# Patient Record
Sex: MALE | Race: WHITE | HISPANIC OR LATINO | URBAN - METROPOLITAN AREA
[De-identification: names, ages, dates, MRNs, and addresses within clinical notes are randomized per-mention and may not be internally consistent; named-entity substitution may affect disease eponyms.]

---

## 2017-08-10 ENCOUNTER — APPOINTMENT (RX ONLY)
Dept: URBAN - METROPOLITAN AREA CLINIC 15 | Facility: CLINIC | Age: 27
Setting detail: DERMATOLOGY
End: 2017-08-10

## 2017-08-10 DIAGNOSIS — L85.3 XEROSIS CUTIS: ICD-10-CM

## 2017-08-10 DIAGNOSIS — L0390 CELLULITIS AND ABSCESS OF UNSPECIFIED SITES: ICD-10-CM

## 2017-08-10 DIAGNOSIS — L81.0 POSTINFLAMMATORY HYPERPIGMENTATION: ICD-10-CM

## 2017-08-10 DIAGNOSIS — L0391 CELLULITIS AND ABSCESS OF UNSPECIFIED SITES: ICD-10-CM

## 2017-08-10 PROBLEM — L70.0 ACNE VULGARIS: Status: ACTIVE | Noted: 2017-08-10

## 2017-08-10 PROBLEM — L02.11 CUTANEOUS ABSCESS OF NECK: Status: ACTIVE | Noted: 2017-08-10

## 2017-08-10 PROCEDURE — ? PRESCRIPTION

## 2017-08-10 PROCEDURE — ? PATIENT SPECIFIC COUNSELING

## 2017-08-10 PROCEDURE — ? TREATMENT REGIMEN

## 2017-08-10 PROCEDURE — 10060 I&D ABSCESS SIMPLE/SINGLE: CPT

## 2017-08-10 PROCEDURE — ? INCISION AND DRAINAGE

## 2017-08-10 PROCEDURE — 99203 OFFICE O/P NEW LOW 30 MIN: CPT | Mod: 25

## 2017-08-10 PROCEDURE — ? COUNSELING

## 2017-08-10 RX ORDER — CLINDAMYCIN PHOSPHATE 10 MG/ML
SOLUTION TOPICAL
Qty: 1 | Refills: 2 | Status: ERX | COMMUNITY
Start: 2017-08-10

## 2017-08-10 RX ADMIN — CLINDAMYCIN PHOSPHATE 1: 10 SOLUTION TOPICAL at 00:00

## 2017-08-10 ASSESSMENT — LOCATION DETAILED DESCRIPTION DERM
LOCATION DETAILED: RIGHT SUPERIOR ANTERIOR NECK
LOCATION DETAILED: RIGHT SUPERIOR LATERAL NECK
LOCATION DETAILED: LEFT SUPERIOR ANTERIOR NECK
LOCATION DETAILED: LEFT ELBOW
LOCATION DETAILED: LEFT SUPERIOR LATERAL NECK
LOCATION DETAILED: RIGHT ELBOW
LOCATION DETAILED: RIGHT INFERIOR LATERAL NECK

## 2017-08-10 ASSESSMENT — SEVERITY ASSESSMENT
SEVERITY: MILD TO MODERATE
SEVERITY: MILD

## 2017-08-10 ASSESSMENT — LOCATION SIMPLE DESCRIPTION DERM
LOCATION SIMPLE: RIGHT ANTERIOR NECK
LOCATION SIMPLE: LEFT ANTERIOR NECK
LOCATION SIMPLE: RIGHT ELBOW
LOCATION SIMPLE: LEFT ELBOW

## 2017-08-10 ASSESSMENT — LOCATION ZONE DERM
LOCATION ZONE: NECK
LOCATION ZONE: ARM

## 2017-08-10 NOTE — PROCEDURE: TREATMENT REGIMEN
Otc Regimen: CeraVe Cream once daily after showers
Detail Level: Zone
Otc Regimen: Zinc oxide / titanium dioxide highly recommended daily

## 2017-08-10 NOTE — PROCEDURE: PATIENT SPECIFIC COUNSELING
Detail Level: Zone
Discussed the importance use of creams to help keep the skin hydrated.
Discussed the importance use of sunscreen to prevent the condition to not get worse.

## 2017-08-10 NOTE — PROCEDURE: INCISION AND DRAINAGE
Epidermal Closure: simple interrupted
Render Postcare In Note?: Yes
Epidermal Sutures: 4-0 Ethilon
Curette: No
Anesthesia Type: 1% lidocaine with epinephrine
Detail Level: Simple
Body Location Override (Optional - Billing Will Still Be Based On Selected Body Map Location If Applicable): neck
Post-Care Instructions: I reviewed with the patient in detail post-care instructions. Patient should keep wound covered and call the office should any redness, pain, swelling or worsening occur.
Size Of Lesion In Cm (Optional But May Be Required For Some Insurances): 0
Lesion Type: Cyst
Preparation Text: The area was prepped in the usual clean fashion.
Method: 11 blade
Consent was obtained and risks were reviewed including but not limited to delayed wound healing, infection, need for multiple I and D's, and pain.
Anesthesia Volume In Cc: 1
Dressing: dry sterile dressing
Suture Text: The incision was partially closed with
Wound Care: Bacitracin
Curette Text (Optional): After the contents were expressed a curette was used to partially remove the cyst wall.

## 2017-12-28 ENCOUNTER — APPOINTMENT (RX ONLY)
Dept: URBAN - METROPOLITAN AREA CLINIC 15 | Facility: CLINIC | Age: 27
Setting detail: DERMATOLOGY
End: 2017-12-28

## 2017-12-28 DIAGNOSIS — L72.8 OTHER FOLLICULAR CYSTS OF THE SKIN AND SUBCUTANEOUS TISSUE: ICD-10-CM

## 2017-12-28 DIAGNOSIS — L85.3 XEROSIS CUTIS: ICD-10-CM

## 2017-12-28 DIAGNOSIS — L81.0 POSTINFLAMMATORY HYPERPIGMENTATION: ICD-10-CM

## 2017-12-28 PROCEDURE — ? TREATMENT REGIMEN

## 2017-12-28 PROCEDURE — ? PRESCRIPTION

## 2017-12-28 PROCEDURE — 99213 OFFICE O/P EST LOW 20 MIN: CPT | Mod: 25

## 2017-12-28 PROCEDURE — ? INTRALESIONAL KENALOG

## 2017-12-28 PROCEDURE — 11900 INJECT SKIN LESIONS </W 7: CPT

## 2017-12-28 PROCEDURE — ? ADDITIONAL NOTES

## 2017-12-28 PROCEDURE — ? COUNSELING

## 2017-12-28 RX ORDER — CLINDAMYCIN PHOSPHATE 10 MG/ML
LOTION TOPICAL
Qty: 1 | Refills: 3 | Status: ERX | COMMUNITY
Start: 2017-12-28

## 2017-12-28 RX ADMIN — CLINDAMYCIN PHOSPHATE 1: 10 LOTION TOPICAL at 00:00

## 2017-12-28 ASSESSMENT — LOCATION DETAILED DESCRIPTION DERM
LOCATION DETAILED: LEFT SUPERIOR ANTERIOR NECK
LOCATION DETAILED: RIGHT SUPERIOR ANTERIOR NECK
LOCATION DETAILED: RIGHT SUPERIOR MEDIAL UPPER BACK
LOCATION DETAILED: INFERIOR THORACIC SPINE
LOCATION DETAILED: RIGHT INFERIOR LATERAL NECK
LOCATION DETAILED: RIGHT SUPERIOR MEDIAL BUCCAL CHEEK

## 2017-12-28 ASSESSMENT — LOCATION SIMPLE DESCRIPTION DERM
LOCATION SIMPLE: LEFT ANTERIOR NECK
LOCATION SIMPLE: RIGHT CHEEK
LOCATION SIMPLE: RIGHT ANTERIOR NECK
LOCATION SIMPLE: UPPER BACK
LOCATION SIMPLE: RIGHT UPPER BACK

## 2017-12-28 ASSESSMENT — LOCATION ZONE DERM
LOCATION ZONE: NECK
LOCATION ZONE: TRUNK
LOCATION ZONE: FACE

## 2017-12-28 NOTE — PROCEDURE: ADDITIONAL NOTES
Additional Notes: Consider additional IL-TAC as patient is concerned about scarring and PIPA. Plan excision of right anterior medial neck lesion with Dr. Dylan Wills if lesion recurs. Patient understands. Other small lesions likely to resolve with IL-TAC. Patient advised to not pick or squeeze at lesions.

## 2017-12-28 NOTE — PROCEDURE: INTRALESIONAL KENALOG
Lot # (Optional): [id number]
Include Z78.9 (Other Specified Conditions Influencing Health Status) As An Associated Diagnosis?: Yes
Consent: The risks of atrophy were reviewed with the patient.
Detail Level: Zone
Total Volume Injected (Ccs- Only Use Numbers And Decimals): 0.4
Kenalog Preparation: Kenalog
Expiration Date (Optional): 3/2019
X Size Of Lesion In Cm (Optional): 0
Medical Necessity Clause: This procedure was medically necessary because the lesions that were treated were:
Concentration Of Solution Injected (Mg/Ml): 2.5

## 2018-02-23 ENCOUNTER — APPOINTMENT (RX ONLY)
Dept: URBAN - METROPOLITAN AREA CLINIC 15 | Facility: CLINIC | Age: 28
Setting detail: DERMATOLOGY
End: 2018-02-23

## 2018-02-23 DIAGNOSIS — L85.3 XEROSIS CUTIS: ICD-10-CM

## 2018-02-23 DIAGNOSIS — L81.0 POSTINFLAMMATORY HYPERPIGMENTATION: ICD-10-CM

## 2018-02-23 DIAGNOSIS — L72.8 OTHER FOLLICULAR CYSTS OF THE SKIN AND SUBCUTANEOUS TISSUE: ICD-10-CM

## 2018-02-23 PROCEDURE — ? COUNSELING

## 2018-02-23 PROCEDURE — 99213 OFFICE O/P EST LOW 20 MIN: CPT | Mod: 25

## 2018-02-23 PROCEDURE — ? TREATMENT REGIMEN

## 2018-02-23 PROCEDURE — ? INTRALESIONAL KENALOG

## 2018-02-23 PROCEDURE — 11900 INJECT SKIN LESIONS </W 7: CPT

## 2018-02-23 ASSESSMENT — LOCATION DETAILED DESCRIPTION DERM
LOCATION DETAILED: LEFT LOWER CUTANEOUS LIP
LOCATION DETAILED: LEFT SUPERIOR LATERAL NECK
LOCATION DETAILED: RIGHT MID-UPPER BACK
LOCATION DETAILED: RIGHT INFERIOR LATERAL NECK
LOCATION DETAILED: STERNUM
LOCATION DETAILED: LEFT INFERIOR LATERAL NECK

## 2018-02-23 ASSESSMENT — LOCATION ZONE DERM
LOCATION ZONE: NECK
LOCATION ZONE: LIP
LOCATION ZONE: TRUNK

## 2018-02-23 ASSESSMENT — LOCATION SIMPLE DESCRIPTION DERM
LOCATION SIMPLE: LEFT LIP
LOCATION SIMPLE: RIGHT UPPER BACK
LOCATION SIMPLE: LEFT ANTERIOR NECK
LOCATION SIMPLE: CHEST
LOCATION SIMPLE: RIGHT ANTERIOR NECK

## 2018-02-23 NOTE — PROCEDURE: INTRALESIONAL KENALOG
Total Volume Injected (Ccs- Only Use Numbers And Decimals): 0.5
Include Z78.9 (Other Specified Conditions Influencing Health Status) As An Associated Diagnosis?: Yes
Expiration Date (Optional): 3/2019
Medical Necessity Clause: This procedure was medically necessary because the lesions that were treated were:
Lot # (Optional): [id number]
X Size Of Lesion In Cm (Optional): 0
Kenalog Preparation: Kenalog
Concentration Of Solution Injected (Mg/Ml): 2.5
Detail Level: Simple
Consent: The risks of atrophy were reviewed with the patient.

## 2021-01-27 ENCOUNTER — APPOINTMENT (RX ONLY)
Dept: URBAN - METROPOLITAN AREA CLINIC 15 | Facility: CLINIC | Age: 31
Setting detail: DERMATOLOGY
End: 2021-01-27

## 2021-01-27 DIAGNOSIS — Z41.9 ENCOUNTER FOR PROCEDURE FOR PURPOSES OTHER THAN REMEDYING HEALTH STATE, UNSPECIFIED: ICD-10-CM

## 2021-01-27 DIAGNOSIS — L85.3 XEROSIS CUTIS: ICD-10-CM | Status: WORSENING

## 2021-01-27 DIAGNOSIS — L57.8 OTHER SKIN CHANGES DUE TO CHRONIC EXPOSURE TO NONIONIZING RADIATION: ICD-10-CM

## 2021-01-27 DIAGNOSIS — L72.0 EPIDERMAL CYST: ICD-10-CM | Status: WORSENING

## 2021-01-27 PROCEDURE — ? COOLSCULPTING

## 2021-01-27 PROCEDURE — ? MEDICAL CONSULTATION: DYNAMIC RHYTIDES

## 2021-01-27 PROCEDURE — ? COUNSELING

## 2021-01-27 PROCEDURE — 99213 OFFICE O/P EST LOW 20 MIN: CPT | Mod: 25

## 2021-01-27 PROCEDURE — ? TREATMENT REGIMEN

## 2021-01-27 PROCEDURE — ? MEDICAL CONSULTATION: COOLSCULPTING

## 2021-01-27 PROCEDURE — 11900 INJECT SKIN LESIONS </W 7: CPT

## 2021-01-27 PROCEDURE — ? SUNSCREEN RECOMMENDATIONS

## 2021-01-27 PROCEDURE — ? PRESCRIPTION

## 2021-01-27 PROCEDURE — ? INTRALESIONAL KENALOG

## 2021-01-27 PROCEDURE — ? ADDITIONAL NOTES

## 2021-01-27 RX ORDER — CERAMIDE 1,3,6-II/SALICYLIC/B3
1 CLEANSER (ML) TOPICAL QD
Qty: 453 | Refills: 1 | Status: ERX | COMMUNITY
Start: 2021-01-27

## 2021-01-27 RX ADMIN — Medication 1: at 00:00

## 2021-01-27 ASSESSMENT — LOCATION DETAILED DESCRIPTION DERM
LOCATION DETAILED: RIGHT INFERIOR CENTRAL MALAR CHEEK
LOCATION DETAILED: SUPERIOR MID FOREHEAD
LOCATION DETAILED: LEFT SUPERIOR LATERAL SCROTUM
LOCATION DETAILED: PERIUMBILICAL SKIN
LOCATION DETAILED: LEFT CENTRAL MALAR CHEEK
LOCATION DETAILED: LEFT CHIN
LOCATION DETAILED: LEFT SUPERIOR LATERAL MALAR CHEEK
LOCATION DETAILED: GLABELLA
LOCATION DETAILED: STERNUM
LOCATION DETAILED: RIGHT MID-UPPER BACK
LOCATION DETAILED: RIGHT LATERAL FOREHEAD
LOCATION DETAILED: RIGHT SUPERIOR LATERAL MALAR CHEEK
LOCATION DETAILED: RIGHT MEDIAL FOREHEAD
LOCATION DETAILED: LEFT MEDIAL FOREHEAD

## 2021-01-27 ASSESSMENT — LOCATION ZONE DERM
LOCATION ZONE: TRUNK
LOCATION ZONE: FACE
LOCATION ZONE: TRUNK
LOCATION ZONE: SCROTUM
LOCATION ZONE: FACE

## 2021-01-27 ASSESSMENT — LOCATION SIMPLE DESCRIPTION DERM
LOCATION SIMPLE: CHEST
LOCATION SIMPLE: RIGHT CHEEK
LOCATION SIMPLE: LEFT CHEEK
LOCATION SIMPLE: CHIN
LOCATION SIMPLE: GLABELLA
LOCATION SIMPLE: RIGHT CHEEK
LOCATION SIMPLE: LEFT SCROTUM
LOCATION SIMPLE: SUPERIOR FOREHEAD
LOCATION SIMPLE: RIGHT FOREHEAD
LOCATION SIMPLE: RIGHT UPPER BACK
LOCATION SIMPLE: ABDOMEN
LOCATION SIMPLE: LEFT CHEEK
LOCATION SIMPLE: LEFT FOREHEAD

## 2021-01-27 NOTE — PROCEDURE: TREATMENT REGIMEN
Detail Level: Zone
Continue Regimen: CeraVe lotion daily
Plan: .\\nPatient has been referred to Dr. Rex Mccartney (Urologist) \\n\\nAddress: [address], [place] [address]\\nProducts and Services: [employer]\\nAppointments: [email or web address]\\nPhone: [phone]

## 2021-01-27 NOTE — PROCEDURE: SUNSCREEN RECOMMENDATIONS

## 2021-01-27 NOTE — HPI: COSMETIC CONSULTATION
Additional History: The patient would like to eliminate the excess of fat he has on his lower abdominal area.

## 2021-01-27 NOTE — PROCEDURE: COOLSCULPTING
Intro: Patient was here for CoolSculpting treatment on the xxx area. \\nPatient signed the consent form, photo release form and had the opportunity to ask questions. Baseline pictures were taken. \\nWeight: xxx lb. \\n\\nPrior to treatment, the area was cleaned with alcohol (pretreatment skin wipes) and marked out with a marking pen. The gel sheet was then applied uniformly. The applicator was applied to the skin with good contact and suction. \\n\\nxxx area was treated with CoolAdvantageâ¢ 6.4 x 2 applicators.
Consent: Written consent obtained, risks reviewed including but not limited to blistering from suction, darker or lighter pigmentary change, bruising, and/or need for multiple treatments.
Detail Level: Zone
Device: [employer]
Price (Use Numbers Only, No Special Characters Or $): 0
Time (Minutes): [address]
Location: upper abdomen
Suction Settings: The suction settings were per protocol.
Applicators: CoolAdvantage Core
Indication: non-invasive fat removal
Post-Care Instructions: I reviewed with the patient in detail post-care instructions. Patient should stay away from the sun and wear sun protection until treated areas are fully healed.
Post Treatment: After treatment, the suction was turned off, and the applicator was removed from the skin. \\nA massage of 2 minutes of duration was performed area immediately after removing the applicators. \\nTreatment was well tolerated without complications. \\n\\nPatient reported 0 pain by the time of leaving. The area had mild erythema; it was numbed and had no bruising, blanching or signs of epidermal damage. \\nI gave home instructions with our phone and email contacts and I suggested drinking a lot of water, avoiding anti-inflammatories for 2 weeks (except Tylenol), Vitamin D3 3,000 UI, Vitamin C 1,000 mg and Zinc 50 mg once a day for 3 months and follow up in 1 month.

## 2021-01-27 NOTE — PROCEDURE: INTRALESIONAL KENALOG
Podiatry and vascular seeing
X Size Of Lesion In Cm (Optional): 0
Include Z78.9 (Other Specified Conditions Influencing Health Status) As An Associated Diagnosis?: Yes
Kenalog Preparation: Kenalog
Ndc# For Kenalog Only: 6621-5773-93
Consent: The risks of atrophy were reviewed with the patient.
Lot # (Optional): [id number]
Concentration Of Solution Injected (Mg/Ml): 2.5
Detail Level: Detailed
Expiration Date (Optional): mar 2022
Medical Necessity Clause: This procedure was medically necessary because the lesions that were treated were:
Administered By (Optional): Benjamin Kanner, PA-C
Total Volume Injected (Ccs- Only Use Numbers And Decimals): .6

## 2021-02-24 ENCOUNTER — APPOINTMENT (RX ONLY)
Dept: URBAN - METROPOLITAN AREA CLINIC 15 | Facility: CLINIC | Age: 31
Setting detail: DERMATOLOGY
End: 2021-02-24

## 2021-02-24 DIAGNOSIS — L57.8 OTHER SKIN CHANGES DUE TO CHRONIC EXPOSURE TO NONIONIZING RADIATION: ICD-10-CM

## 2021-02-24 DIAGNOSIS — L85.3 XEROSIS CUTIS: ICD-10-CM

## 2021-02-24 DIAGNOSIS — L72.0 EPIDERMAL CYST: ICD-10-CM

## 2021-02-24 PROCEDURE — ? PRESCRIPTION

## 2021-02-24 PROCEDURE — ? INCISION AND DRAINAGE

## 2021-02-24 PROCEDURE — ? SUNSCREEN RECOMMENDATIONS

## 2021-02-24 PROCEDURE — ? COUNSELING

## 2021-02-24 PROCEDURE — ? TREATMENT REGIMEN

## 2021-02-24 PROCEDURE — 10061 I&D ABSCESS COMP/MULTIPLE: CPT | Mod: NMN

## 2021-02-24 PROCEDURE — 99213 OFFICE O/P EST LOW 20 MIN: CPT | Mod: 25

## 2021-02-24 RX ORDER — CERAMIDE 1,3,6-II/SALICYLIC/B3
1 CLEANSER (ML) TOPICAL QD
Qty: 453 | Refills: 1 | COMMUNITY
Start: 2021-02-24

## 2021-02-24 RX ORDER — MUPIROCIN 20 MG/G
1 OINTMENT TOPICAL BID
Qty: 22 | Refills: 0 | Status: ERX | COMMUNITY
Start: 2021-02-24

## 2021-02-24 RX ADMIN — MUPIROCIN 1: 20 OINTMENT TOPICAL at 00:00

## 2021-02-24 RX ADMIN — Medication 1: at 00:00

## 2021-02-24 ASSESSMENT — LOCATION SIMPLE DESCRIPTION DERM
LOCATION SIMPLE: SCROTUM
LOCATION SIMPLE: RIGHT CHEEK
LOCATION SIMPLE: CHIN
LOCATION SIMPLE: SUPERIOR FOREHEAD
LOCATION SIMPLE: LEFT CHEEK
LOCATION SIMPLE: RIGHT UPPER BACK
LOCATION SIMPLE: CHEST

## 2021-02-24 ASSESSMENT — LOCATION ZONE DERM
LOCATION ZONE: FACE
LOCATION ZONE: GENITALIA
LOCATION ZONE: TRUNK

## 2021-02-24 ASSESSMENT — LOCATION DETAILED DESCRIPTION DERM
LOCATION DETAILED: STERNUM
LOCATION DETAILED: RIGHT INFERIOR CENTRAL MALAR CHEEK
LOCATION DETAILED: LEFT CHIN
LOCATION DETAILED: RIGHT MID-UPPER BACK
LOCATION DETAILED: LEFT CENTRAL MALAR CHEEK
LOCATION DETAILED: SUPERIOR MID FOREHEAD
LOCATION DETAILED: LEFT SCROTUM

## 2021-02-24 NOTE — PROCEDURE: TREATMENT REGIMEN
Plan: .\\nPatient has been referred to Dr. Kenan Carver (Urologist) \\n\\nAddress: [address], [place] [address]\\nProducts and Services: [employer]\\nAppointments: [email or web address]\\nPhone: [phone]\\n\\n**Information has been provided to patient in office.
Detail Level: Zone
Continue Regimen: CeraVe lotion daily

## 2021-02-24 NOTE — PROCEDURE: INCISION AND DRAINAGE
Detail Level: Detailed
Lesion Type: Abscesses
I&D: multiple
Method: 15 blade
Curette: No
Anesthesia Type: 1% lidocaine with epinephrine
Anesthesia Volume In Cc: 3
Size Of Lesion In Cm (Optional But May Be Required For Some Insurances): 0
Wound Care: Petrolatum
Dressing: dry sterile dressing
Curette Text (Optional): After the contents were expressed a curette was used to partially remove the cyst wall.
Epidermal Sutures: steri-strips
Epidermal Closure: simple interrupted
Suture Text: The incision was partially closed with
Consent was obtained and risks were reviewed including but not limited to delayed wound healing, infection, need for multiple I and D's, and pain.
Post-Care Instructions: I reviewed with the patient in detail post-care instructions. Patient should keep wound covered and call the office should any redness, pain, swelling or worsening occur.

## 2021-02-24 NOTE — PROCEDURE: SUNSCREEN RECOMMENDATIONS

## 2022-05-03 ENCOUNTER — APPOINTMENT (RX ONLY)
Dept: URBAN - METROPOLITAN AREA CLINIC 15 | Facility: CLINIC | Age: 32
Setting detail: DERMATOLOGY
End: 2022-05-03

## 2022-05-03 DIAGNOSIS — Z41.9 ENCOUNTER FOR PROCEDURE FOR PURPOSES OTHER THAN REMEDYING HEALTH STATE, UNSPECIFIED: ICD-10-CM

## 2022-05-03 PROCEDURE — ? COOLSCULPTING ELITE

## 2022-05-03 NOTE — PROCEDURE: COOLSCULPTING ELITE
Suction Settings: The suction settings were per protocol.
Applicator Size: standard applicator
Patient Weight (Optional): 181 lb.
Time (Minutes - Will Only Render If Nonzero): [address]
Treatment Administered By (Optional): Hubert Ryder
Time (Minutes - Will Only Render If Nonzero): 35
Post Treatment: After treatment, the suction was turned off, and the applicator was removed from the skin.
Detail Level: Detailed
Other Location 1: Posterior Flanks
Time (Minutes - Will Only Render If Nonzero): 0
Applicator Size: Curve 150
Consent: Written consent obtained, risks reviewed including, but not limited to, blistering from suction, darker or lighter pigmentary change, bruising, and/or need for multiple treatments.
Device: Coolsculpting Elite
Applicator Size: Curve 120
Applicator Size: Flat 125
Intro: Prior to treatment, the area was cleaned with alcohol and marked out with a marking pen. The gel sheet was then applied uniformly. The applicator was applied to the skin with good contact and suction.
Location 1: other (specify below)

## 2022-05-03 NOTE — HPI: COSMETIC (COOLSCULPTING)
Have You Had Previous Coolsculpting Treatments Before?: has had previous treatments
Additional History: The patient would like to eliminate the fat bulges he has on his Flanks areas with the CoolSculpting procedure.

## 2022-06-08 ENCOUNTER — APPOINTMENT (RX ONLY)
Dept: URBAN - METROPOLITAN AREA CLINIC 15 | Facility: CLINIC | Age: 32
Setting detail: DERMATOLOGY
End: 2022-06-08

## 2022-06-08 DIAGNOSIS — Z41.9 ENCOUNTER FOR PROCEDURE FOR PURPOSES OTHER THAN REMEDYING HEALTH STATE, UNSPECIFIED: ICD-10-CM

## 2022-06-08 PROCEDURE — ? COOLSCULPTING ELITE

## 2022-06-08 NOTE — HPI: COSMETIC (COOLSCULPTING)
Have You Had Previous Coolsculpting Treatments Before?: has had previous treatments
Number Of Treatments: 2
When Was Your Last Treatment?: 05/03/2022
Additional History: The patient is here to have the CoolSculpting procedure performed on his right posterior flank. One month ago the patient was treated on his lower abdomen and both of his posterior flanks. When treating his right posterior flank 3 applicator errors occurred consecutively (this applicator required to be replaced), and for this reason we decided to defer the procedure for another day. Today the patient came to have the Coolsculpting procedure on his right posterior flank (already paid). In addition, the patient would also like to finish eliminating the small amount of fat he has on his lower abdomen.

## 2022-06-08 NOTE — PROCEDURE: COOLSCULPTING ELITE
Other Location 2: Lower central abdomen.
Applicator Size: standard applicator
Time (Minutes - Will Only Render If Nonzero): [address]
Time (Minutes - Will Only Render If Nonzero): 35
Time (Minutes - Will Only Render If Nonzero): 0
Applicator Size: Curve 150
Suction Settings: The suction settings were per protocol.
Detail Level: Detailed
Intro: Prior to treatment, the area was cleaned with alcohol and marked out with a marking pen. The gel sheet was then applied uniformly. The applicator was applied to the skin with good contact and suction.
Post Treatment: After treatment, the suction was turned off, and the applicator was removed from the skin.
Consent: Written consent obtained, risks reviewed including, but not limited to, blistering from suction, darker or lighter pigmentary change, bruising, and/or need for multiple treatments.
Location 9: other (specify below)
Treatment Administered By (Optional): Sana Baxter
Applicator Size: Curve 120
Device: Coolsculpting Elite
Other Location 1: Right Posterior Flank